# Patient Record
(demographics unavailable — no encounter records)

---

## 2025-03-02 NOTE — HISTORY OF PRESENT ILLNESS
[FreeTextEntry1] : 25-year-old female presents for blood pressure check.  She is status post vaginal delivery on February 22.  The patient presented to labor and delivery on Monday February 24 for elevated blood pressures at home.  Patient was complaining of a headache.  The headache did resolve with ibuprofen and Fioricet.  She was also complaining of fatigue and viral symptoms.  Her child at home did have a virus.  Respiratory virus panel was negative.  Her preeclamptic labs were within normal limits.  She was discharged home on labetalol 200 mg 3 times daily.  The patient states that her blood pressures have been well-controlled ranging 120-130 over 80s.  The patient states since she was discharged on Monday night the headaches have improved.  They do get better if she takes ibuprofen.  She was breast-feeding her baby but has now changed to bottles.  She states she is getting more rest but however still feeling fatigued and weak.  She thinks that this could be secondary to her blood pressure medication.  She currently denies any headaches, visual changes, epigastric pain, right upper quadrant pain or swelling.  She was not anemic when leaving the hospital.  She has had minimal lochia.  Denies fevers.

## 2025-03-02 NOTE — PLAN
[FreeTextEntry1] : 25-year-old female status post vaginal delivery with elevated blood pressures now on blood pressure medication.  Patient had a workup at labor and delivery to rule out postpartum preeclampsia.  Blood work was within normal limits.  She was discharged home with labetalol 200 3 times daily.  The patient's blood pressures have been well-controlled however she is complaining of more fatigue and feeling weak.  She thinks this is secondary to the blood pressure medication.  Will change to Procardia 30 XL daily.  Patient was instructed to keep a blood pressure log.  She will return to the office in 3 days for a follow-up blood pressure check.  Strict call parameters were reviewed.  We discussed adequate hydration, nutrition, and rest at home with a .  Call parameters were reviewed.  Emotional support was provided.  The patient was given the opportunity to ask questions and all were answered to her satisfaction.

## 2025-03-21 NOTE — HISTORY OF PRESENT ILLNESS
[FreeTextEntry1] : 26 y/o F, , presents for initial visit. Patient is s/p  at 38w6d complicated by gestational HTN. Patient reports history of PEC following her first pregnancy, required labetalol for a few weeks after delivery. She denies any other significant PMH. She reports occasional episodes of midsternal chest discomfort, lasts a few seconds and self resolves. Nonradiating, nonexertional. Also reports some palpitations, described as her heart racing.  Denies HA, blurry vision, dyspnea, dizziness, lightheadedness, LE edema, syncope or near syncope. Not currently breastfeeding Family Hx: denies premature CAD or sudden cardiac death Social Hx: denies smoking, occasional ETOH, denies illicit drug use

## 2025-03-21 NOTE — DISCUSSION/SUMMARY
[FreeTextEntry1] : 26 y/o F, , presents for initial visit. Patient is s/p  at 38w6d complicated by gestational HTN. Reports occasional chest pain.  #CP Atypical, likely noncardiac  Trial NSAIDS  TTE ordered to assess baseline LV function  Will consider treadmill stress test if symptoms persist/worsen   #gHTN BP currently controlled Would hold nifedipine; patient will continue to monitor and take PRN BP>140/90 Maintain ambulatory BP log Patient was counseled on increased risk of recurrent PEC, cHTN, and CVD. She was advised to follow a heart healthy diet and aim for 150 mins of moderate intensity exercise per week.  [EKG obtained to assist in diagnosis and management of assessed problem(s)] : EKG obtained to assist in diagnosis and management of assessed problem(s)

## 2025-04-04 NOTE — COUNSELING
[Nutrition/ Exercise/ Weight Management] : nutrition, exercise, weight management [Vitamins/Supplements] : vitamins/supplements [Contraception/ Emergency Contraception/ Safe Sexual Practices] : contraception, emergency contraception, safe sexual practices [Confidentiality] : confidentiality [Lab Results] : lab results [Medication Management] : medication management

## 2025-04-04 NOTE — PLAN
[FreeTextEntry1] : She is doing well in the postpartum period and was counseled on expectations regarding the return of her menstrual period status post vaginal delivery and while breast-feeding.  She does not wish for contraception at this time, but understands it may be made available upon request.  She is no longer on antihypertensive medications and her blood pressure is well within normal limits.  She will return to office in 3 months time for annual well appointment or as needed.  She was given opportunity to ask questions and all questions were addressed.

## 2025-04-04 NOTE — HISTORY OF PRESENT ILLNESS
[FreeTextEntry1] : 25-year-old female -0-0-2 presenting office for her postpartum appointment.  History of pregnancy-induced hypertension and postpartum preeclampsia incipient laboratory blood work is within normal limits.  Her antihypertensive medications have been discontinued as per cardiology.  She is overall feeling much improved and doing well.    She is status post vaginal delivery on 2025 at 38 weeks and 6 days delivering a baby girl weighing 3715 g with Apgars 9 and 9.  Pregnancy was complicated by history of preeclampsia in a prior pregnancy.  She subsequently was started on Procardia 30 mg; all medications have since been discontinued

## 2025-04-04 NOTE — REASON FOR VISIT
[Follow-Up] : a follow-up evaluation of [FreeTextEntry2] : Postpartum appointment and blood pressure check